# Patient Record
Sex: MALE | Race: WHITE | NOT HISPANIC OR LATINO | Employment: FULL TIME | URBAN - METROPOLITAN AREA
[De-identification: names, ages, dates, MRNs, and addresses within clinical notes are randomized per-mention and may not be internally consistent; named-entity substitution may affect disease eponyms.]

---

## 2023-06-22 ENCOUNTER — HOSPITAL ENCOUNTER (EMERGENCY)
Facility: CLINIC | Age: 28
Discharge: HOME | End: 2023-06-22
Attending: EMERGENCY MEDICINE | Admitting: EMERGENCY MEDICINE
Payer: COMMERCIAL

## 2023-06-22 VITALS
OXYGEN SATURATION: 99 % | HEIGHT: 71 IN | HEART RATE: 76 BPM | SYSTOLIC BLOOD PRESSURE: 150 MMHG | RESPIRATION RATE: 20 BRPM | BODY MASS INDEX: 22.4 KG/M2 | WEIGHT: 160 LBS | TEMPERATURE: 98.1 F | DIASTOLIC BLOOD PRESSURE: 70 MMHG

## 2023-06-22 DIAGNOSIS — S01.21XA LACERATION OF NOSE, INITIAL ENCOUNTER: ICD-10-CM

## 2023-06-22 PROCEDURE — 250N000009 HC RX 250: Performed by: EMERGENCY MEDICINE

## 2023-06-22 PROCEDURE — 99283 EMERGENCY DEPT VISIT LOW MDM: CPT

## 2023-06-22 PROCEDURE — 99283 EMERGENCY DEPT VISIT LOW MDM: CPT | Mod: 25 | Performed by: EMERGENCY MEDICINE

## 2023-06-22 PROCEDURE — 12011 RPR F/E/E/N/L/M 2.5 CM/<: CPT | Performed by: EMERGENCY MEDICINE

## 2023-06-22 PROCEDURE — 12011 RPR F/E/E/N/L/M 2.5 CM/<: CPT

## 2023-06-22 RX ADMIN — Medication 3 ML: at 21:17

## 2023-06-22 ASSESSMENT — ACTIVITIES OF DAILY LIVING (ADL): ADLS_ACUITY_SCORE: 33

## 2023-06-23 NOTE — ED PROVIDER NOTES
"  ED PROVIDER NOTE  June 22, 2023  History     Chief Complaint   Patient presents with     Laceration     Pt states that he was barefoot water skiing and collided with another human getting a cut on his nose. Pt states that he thinks he may have a concussion. Pt is A/Ox4 answering all questions appropriately in triage.      HPI  Ramses Lai is a 27 year old male who arrives today to the emergency department for evaluation of a nasal bridge laceration.  The patient states he was barefoot skiing when he collided with another human being and believes he struck his nose against a heel or leg of another individual.  Patient reports no loss of conscious, nausea, vomiting, headache, change in vision.  He reports no pain in the periorbital region.  No facial discomfort.  He reports no injury to the neck.  No other injury sustained.  He did note an approximate 1.0 cm laceration over the nasal bridge with moderate bleeding controlled with direct pressure.  No other injury sustained.      Past Medical History  No past medical history on file.  No past surgical history on file.  No current outpatient medications on file.    No Known Allergies  Family History  No family history on file.  Social History          A medically appropriate review of systems was performed with pertinent positives and negatives noted in the HPI, and all other systems negative.      Physical Exam   BP: (!) 150/70  Pulse: 76  Temp: 98.1  F (36.7  C)  Resp: 20  Height: 180.3 cm (5' 11\")  Weight: 72.6 kg (160 lb)  SpO2: 99 %      Physical Exam  Vitals and nursing note reviewed.   Constitutional:       General: He is not in acute distress.     Appearance: He is not ill-appearing or diaphoretic.   HENT:      Head: Normocephalic and atraumatic. No raccoon eyes, Reza's sign, abrasion, masses, right periorbital erythema or left periorbital erythema.      Jaw: There is normal jaw occlusion. No pain on movement or malocclusion.        Comments: Chevron shaped " laceration measuring approximate 1 cm.  Minimal active venous oozing.  No underlying significant deformity.     Nose:      Right Nostril: No epistaxis or septal hematoma.      Left Nostril: No epistaxis or septal hematoma.      Mouth/Throat:      Lips: Pink. No lesions.      Mouth: Mucous membranes are moist. No oral lesions.      Tongue: No lesions.      Pharynx: Oropharynx is clear. Uvula midline.   Cardiovascular:      Rate and Rhythm: Normal rate.   Pulmonary:      Effort: Pulmonary effort is normal. No respiratory distress.   Musculoskeletal:      Cervical back: Full passive range of motion without pain and normal range of motion. No rigidity. No pain with movement. Normal range of motion.   Skin:     Capillary Refill: Capillary refill takes less than 2 seconds.   Neurological:      General: No focal deficit present.      Mental Status: He is alert and oriented to person, place, and time.      Cranial Nerves: No cranial nerve deficit.   Psychiatric:         Mood and Affect: Mood normal.         Behavior: Behavior normal.         ED [facility]    -Laceration Repair    Date/Time: 6/22/2023 9:42 PM    Performed by: Matt Rebolledo MD  Authorized by: Matt Rebolledo MD    Risks, benefits and alternatives discussed.      ANESTHESIA (see MAR for exact dosages):     Anesthesia method:  Topical application    Topical anesthetic:  LET  LACERATION DETAILS     Location:  Face    Face location:  Nose    Length (cm):  1    REPAIR TYPE:     Repair type:  Simple      EXPLORATION:     Wound extent: no nerve damage, no tendon damage and no vascular damage      Contaminated: no      TREATMENT:     Area cleansed with:  Saline    Amount of cleaning:  Standard    Irrigation solution:  Sterile saline    Visualized foreign bodies/material removed: no      SKIN REPAIR     Repair method:  Sutures    Suture size:  5-0    Suture material:  Fast-absorbing gut     Number of sutures:  4    APPROXIMATION     Approximation:  Close    POST-PROCEDURE DETAILS     Dressing:  Antibiotic ointment        PROCEDURE    Patient Tolerance:  Patient tolerated the procedure well with no immediate complications           No results found for this or any previous visit (from the past 24 hour(s)).  Medications   lido-EPINEPHrine-tetracaine (LET) topical gel GEL (3 mLs Topical $Given 6/22/23 6329)             Critical care was not performed.     Medical Decision Making  The patient's presentation was of low complexity (an acute and uncomplicated illness or injury).    The patient's evaluation involved:  history and exam without other MDM data elements          Assessments & Plan (with Medical Decision Making)     Ramses Lai is a 27 year old male who arrives today to the emergency department for evaluation of a nasal bridge laceration.  On arrival patient will be alert, currently protecting airway and phonating.  GCS 15.  Other than nasal bridge laceration no visible signs of external trauma to the head.  No palpable pain with palpation of the periorbital bones or maxilla/mandible.  No discomfort with palpation of the C, T spine.  Very low suspicion for intracranial bleed or facial bone fracture I did offer CT imaging or x-ray of the nose.  Low suspicion this would .  No sign of septal hematoma.  Patient would prefer to hold on imaging and I think this is reasonable at this time.  No sign of neurovascular deficit or other traumatic injury on secondary survey.  Patient does have an approximate 1 cm chevron shaped laceration over the nasal bridge.  Minor active venous oozing.  Wound treated with topical anesthesia, thorough irrigation and closed primarily.  Discussed local wound care, monitoring for signs of infection as well as other signs of traumatic injury regarding need for indication for emergent return.    I have reviewed the nursing notes.    I have reviewed the  findings, diagnosis, plan and need for follow up with the patient.    New Prescriptions    No medications on file       Final diagnoses:   Laceration of nose, initial encounter       MATT BLANCO MD    6/22/2023   Formerly [facility] EMERGENCY DEPARTMENT     Matt Blanco MD  06/22/23 4411

## 2023-06-23 NOTE — ED TRIAGE NOTES
Pt states that he was barefoot water skiing and collided with another human getting a cut on his nose. Pt states that he thinks he may have a concussion. Pt is A/Ox4 answering all questions appropriately in triage.        Triage Assessment     Row Name 06/22/23 2032       Triage Assessment (Adult)    Airway WDL WDL       Respiratory WDL    Respiratory WDL WDL       Skin Circulation/Temperature WDL    Skin Circulation/Temperature WDL WDL       Cardiac WDL    Cardiac WDL WDL       Peripheral/Neurovascular WDL    Peripheral Neurovascular WDL WDL       Cognitive/Neuro/Behavioral WDL    Cognitive/Neuro/Behavioral WDL WDL

## 2023-06-23 NOTE — DISCHARGE INSTRUCTIONS
I was happy to see that you had no significant trauma to the head.  As we discussed the likely is a nasal bone fracture underlying that are typically monitored.  I suspect you will have some increase in swelling over the next day or 2.  We did closed the wound primarily with absorbable sutures.  These may get wet briefly such as a shower or washing your face.  Once dry please place a topical triple antibiotic daily for the next 5 to 7 days.  As the scabs over and heals I would place sunscreen over this to allow for improved healing.  Should you notice signs of infection such as increasing redness, drainage of pus, worsening of swelling over the next 3 to 5 days please call or return emergently.  Otherwise as we discussed we held on imaging of the head such as CT scans.  Have a low suspicion for fracture of the face or bleeding internally however should you develop severe headache, nausea, vomiting, confusion, difficulty walking or any other concern please call or return emergently.